# Patient Record
Sex: FEMALE | Race: ASIAN | URBAN - METROPOLITAN AREA
[De-identification: names, ages, dates, MRNs, and addresses within clinical notes are randomized per-mention and may not be internally consistent; named-entity substitution may affect disease eponyms.]

---
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
## 2024-10-10 ENCOUNTER — TELEPHONE (OUTPATIENT)
Dept: FAMILY MEDICINE | Facility: CLINIC | Age: 19
End: 2024-10-10

## 2024-10-10 NOTE — TELEPHONE ENCOUNTER
"Patient calls stating that she is returning a call from a nurse Leslie. RN reviewed chart and found no outgoing calls. Patient's LOV was 2015. Patient then states \"It looks like they were calling for someone named Marcos, so I don't think it was for me.\"  Call was then ended.       "